# Patient Record
Sex: MALE | Race: WHITE
[De-identification: names, ages, dates, MRNs, and addresses within clinical notes are randomized per-mention and may not be internally consistent; named-entity substitution may affect disease eponyms.]

---

## 2019-08-02 ENCOUNTER — HOSPITAL ENCOUNTER (EMERGENCY)
Dept: HOSPITAL 10 - E/R | Age: 39
Discharge: HOME | End: 2019-08-02
Payer: SELF-PAY

## 2019-08-02 ENCOUNTER — HOSPITAL ENCOUNTER (EMERGENCY)
Dept: HOSPITAL 91 - E/R | Age: 39
Discharge: HOME | End: 2019-08-02
Payer: SELF-PAY

## 2019-08-02 VITALS — RESPIRATION RATE: 18 BRPM | HEART RATE: 90 BPM | SYSTOLIC BLOOD PRESSURE: 150 MMHG | DIASTOLIC BLOOD PRESSURE: 91 MMHG

## 2019-08-02 VITALS — BODY MASS INDEX: 34.63 KG/M2 | WEIGHT: 176.37 LBS | HEIGHT: 60 IN

## 2019-08-02 DIAGNOSIS — M54.5: Primary | ICD-10-CM

## 2019-08-02 PROCEDURE — 99283 EMERGENCY DEPT VISIT LOW MDM: CPT

## 2019-08-02 PROCEDURE — 72100 X-RAY EXAM L-S SPINE 2/3 VWS: CPT

## 2019-08-02 NOTE — ERD
ER Documentation


Chief Complaint


Chief Complaint





back pain x 1 mos





HPI


38-year-old male is here with lower back pain that is had for 1 month.  


Sometimes it radiates to his right hip.  No abdominal pain.  No fever.  No 


dysuria hematuria or urinary frequency.  No flank pain.  No nausea vomiting or 


diarrhea.  No testicular pain.  No bowel or bladder incontinence.  No saddle 


anesthesia.  Has not taken any medications for his symptoms.  Denies any trauma.





ROS


All systems reviewed and are negative except as per history of present illness.





Medications


Home Meds


Active Scripts


Cyclobenzaprine Hcl* (Cyclobenzaprine Hcl*) 10 Mg Tablet, 10 MG PO Q8 PRN for 


MUSCLE SPASMS, #20 TAB


   Prov:GRETCHEN ROB PA-C         8/2/19


Ibuprofen* (Motrin*) 800 Mg Tab, 800 MG PO Q6, #30 TAB


   Prov:GRETCHEN ROB PA-C         8/2/19





FmHx


Family History:  No diabetes





Physical Exam


Vitals





Vital Signs


  Date      Temp  Pulse  Resp  B/P (MAP)   Pulse Ox  O2          O2 Flow    FiO2


Time                                                 Delivery    Rate


    8/2/19  97.7     90    18      150/91        99


     15:05                          (110)





Physical Exam


INITIAL VITAL SIGNS: Reviewed by me


GENERAL: Awake, alert and oriented x 4, well appearing, nontoxic, speaking in 


full sentences. No acute distress


HEAD: Atraumatic





RESPIRATORY: Clear to auscultation bilaterally. Symmetric chest wall rise.  No 


wheezing or rales. No accessory muscle use.  


CV: Regular rate and rhythm. No murmurs, rubs, or gallops.  


ABDOMEN: Soft, non-distended. Nontender. Negative Chester. Negative McBurneys 


point tenderness. No CVA tenderness bilaterally. No guarding. No rebound. 


 Back Exam:   


 Compartments:   Soft


 Motor:         Normal flexion and extension of bilateral hip/knee/ankle/foot


 Sensation:      Intact to light touch throughout


 Bones:       No midline TTP





Procedures/MDM


Patient with back pain.  The differential diagnosis includes but is not limited 


to muscle strain, ligament strain, contusion, arthritis, discogenetic disease, 


non-musculoskeletal, cauda equina syndrome, cord compression, abscess and 


others.  No bowel or bladder incontinence or saddle anesthesia.  X-rays show no 


acute abnormality.  Prescription for Motrin and Flexeril given.  Patient 


counseled regarding my diagnostic impression and care plan. Prior to discharge 


all questions answered. Pt agrees with treatment plan and understands strict 


return precautions. Pt is instructed to follow up with primary care provider 


within 24-48 hours. Precautionary instructions provided including instructions 


to return to the ER if not improving or for any worsening or changing symptoms 


or concerns.





Departure


Diagnosis:  


   Primary Impression:  


   Back pain


Condition:  Stable


Patient Instructions:  Back Pain (Acute Or Chronic)





Additional Instructions:  


Llame al doctor RHETT y susan gabbi JEFF PARA DENTRO DE 1-2 DREW.Dgale a la 


secretaria que nosotros le instruimos hacer esta jeff.Avise o llame si bishop 


condicin se empeora antes de la jeff. Regresa aqui si peor o no mejor.











GRETCHEN ROB PA-C             Aug 2, 2019 15:56